# Patient Record
(demographics unavailable — no encounter records)

---

## 2024-11-07 NOTE — REVIEW OF SYSTEMS
[Negative] : Heme/Lymph [FreeTextEntry4] : ssee HPI  [FreeTextEntry6] : ssee HPI  [FreeTextEntry8] : ssee HPI

## 2024-11-07 NOTE — HISTORY OF PRESENT ILLNESS
[FreeTextEntry8] : 2 week hx of chest congestion.    no known fever no chills/sweats  no N/V/D no abd pain +ve good appetite +ve sore throat mild dysphagia  no ear pain B/L  no rashes  no nasal congestion    pt reports need for Nyquil at night 2n2 cough (loose)  but not productive   pt also reports burning on urinartion c urinary urgency, however, decreased urinary output

## 2024-11-07 NOTE — PHYSICAL EXAM
[No Acute Distress] : no acute distress [Well Nourished] : well nourished [Well Developed] : well developed [Well-Appearing] : well-appearing [EOMI] : extraocular movements intact [Normal Outer Ear/Nose] : the outer ears and nose were normal in appearance [No JVD] : no jugular venous distention [No Respiratory Distress] : no respiratory distress  [No Accessory Muscle Use] : no accessory muscle use [Clear to Auscultation] : lungs were clear to auscultation bilaterally [Normal Rate] : normal rate  [Regular Rhythm] : with a regular rhythm [Normal S1, S2] : normal S1 and S2 [No Carotid Bruits] : no carotid bruits [No Abdominal Bruit] : a ~M bruit was not heard ~T in the abdomen [No Edema] : there was no peripheral edema [No Palpable Aorta] : no palpable aorta [Soft] : abdomen soft [Non Tender] : non-tender [Non-distended] : non-distended [No Masses] : no abdominal mass palpated [No HSM] : no HSM [Normal Bowel Sounds] : normal bowel sounds [Normal Posterior Cervical Nodes] : no posterior cervical lymphadenopathy [Normal Anterior Cervical Nodes] : no anterior cervical lymphadenopathy [No CVA Tenderness] : no CVA  tenderness [Grossly Normal Strength/Tone] : grossly normal strength/tone [No Rash] : no rash [Coordination Grossly Intact] : coordination grossly intact [No Focal Deficits] : no focal deficits [Normal Gait] : normal gait [Normal Affect] : the affect was normal [Normal Mood] : the mood was normal [Normal Insight/Judgement] : insight and judgment were intact [de-identified] : +ve PND post pharynx, dull TMs B

## 2024-11-25 NOTE — PHYSICAL EXAM
[No Acute Distress] : no acute distress [Well Nourished] : well nourished [Well Developed] : well developed [Well-Appearing] : well-appearing [EOMI] : extraocular movements intact [Normal Outer Ear/Nose] : the outer ears and nose were normal in appearance [No JVD] : no jugular venous distention [No Respiratory Distress] : no respiratory distress  [No Accessory Muscle Use] : no accessory muscle use [Clear to Auscultation] : lungs were clear to auscultation bilaterally [Normal Rate] : normal rate  [Regular Rhythm] : with a regular rhythm [Normal S1, S2] : normal S1 and S2 [No Carotid Bruits] : no carotid bruits [No Abdominal Bruit] : a ~M bruit was not heard ~T in the abdomen [No Edema] : there was no peripheral edema [No Palpable Aorta] : no palpable aorta [Soft] : abdomen soft [Non Tender] : non-tender [Non-distended] : non-distended [No Masses] : no abdominal mass palpated [No HSM] : no HSM [Normal Bowel Sounds] : normal bowel sounds [Normal Posterior Cervical Nodes] : no posterior cervical lymphadenopathy [Normal Anterior Cervical Nodes] : no anterior cervical lymphadenopathy [No CVA Tenderness] : no CVA  tenderness [Grossly Normal Strength/Tone] : grossly normal strength/tone [No Rash] : no rash [Coordination Grossly Intact] : coordination grossly intact [No Focal Deficits] : no focal deficits [Normal Gait] : normal gait [Normal Affect] : the affect was normal [Normal Mood] : the mood was normal [Normal Insight/Judgement] : insight and judgment were intact

## 2024-11-25 NOTE — HISTORY OF PRESENT ILLNESS
[FreeTextEntry1] : Follow up  [de-identified] : 32 yo female for 2 week f/u on UTI.  pt is s/p completion of 10 day Doxycycline course.  pt denies f/c/s  Denies change in chronic back discomfort  Denies GI upset  reports urinary urgency w/o frequency and/or pain

## 2025-02-27 NOTE — HISTORY OF PRESENT ILLNESS
[IUD] : has an intrauterine device [Y] : Patient is sexually active [LMP unknown] : LMP unknown [N] : Patient reports normal menses [unknown] : Patient is unsure of the date of her LMP [No] : Patient does not have concerns regarding sex [Currently Active] : currently active [FreeTextEntry1] : Allan is a 33 y.o. female here for vulvovaginal irritation, burning.   C/o vulvovaginal burning, irritation, a somewhat stronger odor that started yesterday. No recent changes in medication, partners, products. No urinary symptoms, pelvic pain, fever/chills.    [PapSmeardate] : 04/24/23 [TextBox_31] : neg [HIVDate] : 07/22/20 [TextBox_53] : neg [SyphilisDate] : 07/22/20 [TextBox_58] : neg [GonorrheaDate] : 06/12/24 [TextBox_63] : neg [ChlamydiaDate] : 06/12/24 [TextBox_68] : neg [HPVDate] : 04/24/23 [TextBox_78] : neg [de-identified] : Kyleena [PGHxTotal] : 0

## 2025-02-27 NOTE — HISTORY OF PRESENT ILLNESS
[IUD] : has an intrauterine device [Y] : Patient is sexually active [LMP unknown] : LMP unknown [N] : Patient reports normal menses [unknown] : Patient is unsure of the date of her LMP [No] : Patient does not have concerns regarding sex [Currently Active] : currently active [FreeTextEntry1] : Allan is a 33 y.o. female here for vulvovaginal irritation, burning.   C/o vulvovaginal burning, irritation, a somewhat stronger odor that started yesterday. No recent changes in medication, partners, products. No urinary symptoms, pelvic pain, fever/chills.    [PapSmeardate] : 04/24/23 [TextBox_31] : neg [HIVDate] : 07/22/20 [TextBox_53] : neg [SyphilisDate] : 07/22/20 [TextBox_58] : neg [GonorrheaDate] : 06/12/24 [TextBox_63] : neg [ChlamydiaDate] : 06/12/24 [TextBox_68] : neg [HPVDate] : 04/24/23 [TextBox_78] : neg [de-identified] : Kyleena [PGHxTotal] : 0

## 2025-02-27 NOTE — PHYSICAL EXAM
[Chaperone Present] : A chaperone was present in the examining room during all aspects of the physical examination [FreeTextEntry2] : PRANEETH Watts  [Appropriately responsive] : appropriately responsive [Alert] : alert [No Acute Distress] : no acute distress [Oriented x3] : oriented x3 [Labia Majora] : normal [Labia Minora] : normal [No Bleeding] : There was no active vaginal bleeding [Normal] : normal [Tenderness] : nontender [Uterine Adnexae] : non-palpable

## 2025-02-27 NOTE — PHYSICAL EXAM
[Chaperone Present] : A chaperone was present in the examining room during all aspects of the physical examination [FreeTextEntry2] : PRANEETH Watst  [Appropriately responsive] : appropriately responsive [Alert] : alert [No Acute Distress] : no acute distress [Oriented x3] : oriented x3 [Labia Majora] : normal [Labia Minora] : normal [No Bleeding] : There was no active vaginal bleeding [Normal] : normal [Tenderness] : nontender [Uterine Adnexae] : non-palpable

## 2025-02-27 NOTE — PLAN
[FreeTextEntry1] : -F/u vaginal and urine cultures, will treat as indicated -Pt. declines presumptive treatment for BV at this time  -Reviewed good vulvovaginal hygiene habits -Call or RTO PRN for any new problems, questions or concerns

## 2025-06-18 NOTE — PLAN
[FreeTextEntry1] : -Unremarkable exam today -IUD to be replaced next summer -Recommended dermatology for routine, annual skin survey -Routine physical activity encouraged -Call or RTO PRN for any new problems, questions or concerns

## 2025-06-18 NOTE — PHYSICAL EXAM
[MA] : MA [Appropriately responsive] : appropriately responsive [Alert] : alert [No Acute Distress] : no acute distress [Soft] : soft [Non-tender] : non-tender [Non-distended] : non-distended [No Mass] : no mass [Oriented x3] : oriented x3 [Examination Of The Breasts] : a normal appearance [No Masses] : no breast masses were palpable [Labia Majora] : normal [Labia Minora] : normal [No Bleeding] : There was no active vaginal bleeding [IUD String] : an IUD string was noted [Normal] : normal [Uterine Adnexae] : non-palpable [FreeTextEntry2] : Megan  [Tenderness] : nontender

## 2025-06-18 NOTE — HISTORY OF PRESENT ILLNESS
[Y] : Patient uses contraception [IUD] : has an intrauterine device [N] : Patient denies prior pregnancies [No] : Patient does not have concerns regarding sex [Men] : men [PapSmeardate] : 4/24/2023 [TextBox_31] : NEG [HIVDate] : 7/22/2020 [TextBox_53] : NEG [SyphilisDate] : 7/22/2020 [TextBox_58] : NEG [TextBox_63] : NEG [GonorrheaDate] : 6/12/2024 [ChlamydiaDate] : 6/12/2024 [TextBox_68] : NEG [HPVDate] : 4/24/2023 [TextBox_78] : NEG [HepatitisBDate] : 7/22/2020 [TextBox_83] : NEG [HepatitisCDate] : 7/22/2020 [TextBox_88] : NEG [LMPDate] : UNSURE [de-identified] : KYLEENA [PGHxTotal] : 0 [FreeTextEntry1] : UNSURE